# Patient Record
Sex: FEMALE | URBAN - METROPOLITAN AREA
[De-identification: names, ages, dates, MRNs, and addresses within clinical notes are randomized per-mention and may not be internally consistent; named-entity substitution may affect disease eponyms.]

---

## 2017-02-15 ENCOUNTER — EMERGENCY (EMERGENCY)
Facility: HOSPITAL | Age: 77
LOS: 1 days | Discharge: HOME | End: 2017-02-15
Attending: EMERGENCY MEDICINE | Admitting: EMERGENCY MEDICINE
Payer: MEDICARE

## 2017-02-15 VITALS
OXYGEN SATURATION: 199 % | RESPIRATION RATE: 18 BRPM | TEMPERATURE: 98 F | SYSTOLIC BLOOD PRESSURE: 126 MMHG | HEART RATE: 64 BPM | DIASTOLIC BLOOD PRESSURE: 56 MMHG

## 2017-02-15 VITALS
SYSTOLIC BLOOD PRESSURE: 132 MMHG | DIASTOLIC BLOOD PRESSURE: 74 MMHG | OXYGEN SATURATION: 98 % | TEMPERATURE: 97 F | HEART RATE: 79 BPM | RESPIRATION RATE: 18 BRPM

## 2017-02-15 DIAGNOSIS — I10 ESSENTIAL (PRIMARY) HYPERTENSION: ICD-10-CM
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
## 2017-02-15 DIAGNOSIS — S00.01XA ABRASION OF SCALP, INITIAL ENCOUNTER: ICD-10-CM

## 2017-02-15 DIAGNOSIS — S30.0XXA CONTUSION OF LOWER BACK AND PELVIS, INITIAL ENCOUNTER: ICD-10-CM

## 2017-02-15 DIAGNOSIS — S09.90XA UNSPECIFIED INJURY OF HEAD, INITIAL ENCOUNTER: ICD-10-CM
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
## 2017-02-15 PROCEDURE — 72220 X-RAY EXAM SACRUM TAILBONE: CPT | Mod: 26

## 2017-02-15 PROCEDURE — 72170 X-RAY EXAM OF PELVIS: CPT | Mod: 26

## 2017-02-15 PROCEDURE — 99284 EMERGENCY DEPT VISIT MOD MDM: CPT

## 2017-02-15 PROCEDURE — 70450 CT HEAD/BRAIN W/O DYE: CPT | Mod: 26

## 2017-02-15 PROCEDURE — 70450 CT HEAD/BRAIN W/O DYE: CPT

## 2017-02-15 PROCEDURE — 72220 X-RAY EXAM SACRUM TAILBONE: CPT

## 2017-02-15 PROCEDURE — 99284 EMERGENCY DEPT VISIT MOD MDM: CPT | Mod: 25

## 2017-02-15 PROCEDURE — 72170 X-RAY EXAM OF PELVIS: CPT

## 2017-02-15 RX ORDER — ACETAMINOPHEN 500 MG
650 TABLET ORAL ONCE
Qty: 0 | Refills: 0 | Status: COMPLETED | OUTPATIENT
Start: 2017-02-15 | End: 2017-02-15

## 2017-02-15 RX ADMIN — Medication 650 MILLIGRAM(S): at 11:37

## 2017-02-15 NOTE — ED PROVIDER NOTE - SKIN, MLM
Skin normal color for race, warm, dry and intact. No evidence of rash.small superficial abrasion to the posterior scalp, no active bleeding, no foreign body

## 2017-02-15 NOTE — ED PROVIDER NOTE - MUSCULOSKELETAL, MLM
Spine appears normal, range of motion is not limited, b/l hip joint no tenderness, NROM, diffuse tenderness to the sacrum/coccyx, b/l upper and lower extremities- symmetrical, neurovascular intact

## 2017-02-15 NOTE — ED ADULT TRIAGE NOTE - CHIEF COMPLAINT QUOTE
patient BIBA s/p fall no LOC. patient complains of buttock pain and small laceration to back of head. bleeding controlled. denies blood thinners, denies dizziness, headache.

## 2017-02-15 NOTE — ED PROVIDER NOTE - OBJECTIVE STATEMENT
75 yo female in the Er after she4 accidentally fell at the museum today. Pt states taht it was purely mechanical fall, she denies any dizziness, lightheadedness, HA, vision changes, CP before or after her fall, denies LOC. pt  was able to ambulated after her fall and denies any pain to her extremities, denies pain or limited ROM to her neck. Pt c/o pain to her lower back- coccyx area, has no numbness to her upper or lower extremities.

## 2017-02-15 NOTE — ED ADULT NURSE NOTE - OBJECTIVE STATEMENT
76yr old female A&Ox4.  Was at the museum today and decided that she would try to balance on one foot with her friends, pt then loss balance and fell on her butt and hit her head on the floor.  C/o 3/10 aching, non-radiating pain to coccyx.  Abrasions noted to head, no active bleeding noted.  Denies LOC, headache, vision changes. PERRLA, no neuro deficits, moves all extremities. Not on blood thinners.  Wound care done.  Cold application given.  Comfort measures in place.

## 2017-02-15 NOTE — ED PROVIDER NOTE - HEAD, MLM
Head shape is symmetrical.  no signs of skull fx, no raccoon eyes, no hemotympanum, no johnson signs, no

## 2017-02-15 NOTE — ED PROVIDER NOTE - MEDICAL DECISION MAKING DETAILS
75 yo female with h/o HTN, in the Er after mechanical fall at the museum today. No LOC. Pt ambulatory after her fall; has small abrasion to her scalp, and her tetanus is UTD. Exam- unremarkable, no deformities to extremities, no fx,  no neuro deficits, scalp abrasion cleaned ,  do not requires any staples. head CT and pelvis xray done- no acute findings. Pt stable for discharge, will f/u with her PMD for re-evaluation.

## 2017-07-28 PROBLEM — Z00.00 ENCOUNTER FOR PREVENTIVE HEALTH EXAMINATION: Noted: 2017-07-28

## 2017-08-01 ENCOUNTER — APPOINTMENT (OUTPATIENT)
Dept: GASTROENTEROLOGY | Facility: CLINIC | Age: 77
End: 2017-08-01
Payer: MEDICARE

## 2017-08-01 VITALS
HEART RATE: 55 BPM | BODY MASS INDEX: 17.75 KG/M2 | WEIGHT: 90.4 LBS | HEIGHT: 60 IN | SYSTOLIC BLOOD PRESSURE: 130 MMHG | RESPIRATION RATE: 14 BRPM | DIASTOLIC BLOOD PRESSURE: 68 MMHG | TEMPERATURE: 97.8 F | OXYGEN SATURATION: 99 %

## 2017-08-01 DIAGNOSIS — Z78.9 OTHER SPECIFIED HEALTH STATUS: ICD-10-CM

## 2017-08-01 DIAGNOSIS — Z86.79 PERSONAL HISTORY OF OTHER DISEASES OF THE CIRCULATORY SYSTEM: ICD-10-CM

## 2017-08-01 DIAGNOSIS — Z80.0 FAMILY HISTORY OF MALIGNANT NEOPLASM OF DIGESTIVE ORGANS: ICD-10-CM

## 2017-08-01 DIAGNOSIS — Z82.49 FAMILY HISTORY OF ISCHEMIC HEART DISEASE AND OTHER DISEASES OF THE CIRCULATORY SYSTEM: ICD-10-CM

## 2017-08-01 PROCEDURE — 99203 OFFICE O/P NEW LOW 30 MIN: CPT

## 2017-08-01 RX ORDER — RAMIPRIL 2.5 MG/1
2.5 CAPSULE ORAL
Refills: 0 | Status: ACTIVE | COMMUNITY

## 2018-01-01 NOTE — ED ADULT NURSE NOTE - CHPI ED SYMPTOMS NEG
no bleeding/no weakness/no fever/no loss of consciousness/no confusion/no vomiting/no tingling/no deformity/no numbness
yes

## 2019-03-31 NOTE — ED PROVIDER NOTE - CARE PLAN
Principal Discharge DX:	Closed head injury without loss of consciousness  Secondary Diagnosis:	Sacral contusion
[address] Detailed

## 2021-07-28 NOTE — ED ADULT NURSE NOTE - CINV DISCH MEDS REVIEWED YN
Detail Level: Zone
Initiate Treatment: Valacyclovir take 2 500mg cap at onset of symptoms, then \\nTake 1 pill daily until cold sores resolve
Yes

## 2022-02-24 NOTE — ED ADULT NURSE NOTE - FALLEN IN THE PAST
no Topical Ketoconazole Counseling: Patient counseled that this medication may cause skin irritation or allergic reactions.  In the event of skin irritation, the patient was advised to reduce the amount of the drug applied or use it less frequently.   The patient verbalized understanding of the proper use and possible adverse effects of ketoconazole.  All of the patient's questions and concerns were addressed.

## 2023-11-13 ENCOUNTER — APPOINTMENT (OUTPATIENT)
Age: 83
End: 2023-11-13
Payer: MEDICARE

## 2023-11-13 DIAGNOSIS — Z12.11 ENCOUNTER FOR SCREENING FOR MALIGNANT NEOPLASM OF COLON: ICD-10-CM

## 2023-11-13 DIAGNOSIS — Z80.0 ENCOUNTER FOR SCREENING FOR MALIGNANT NEOPLASM OF COLON: ICD-10-CM

## 2023-11-13 DIAGNOSIS — D12.6 BENIGN NEOPLASM OF COLON, UNSPECIFIED: ICD-10-CM

## 2023-11-13 DIAGNOSIS — K59.00 CONSTIPATION, UNSPECIFIED: ICD-10-CM

## 2023-11-13 PROCEDURE — 99443: CPT | Mod: 95
